# Patient Record
Sex: MALE | Race: BLACK OR AFRICAN AMERICAN | URBAN - METROPOLITAN AREA
[De-identification: names, ages, dates, MRNs, and addresses within clinical notes are randomized per-mention and may not be internally consistent; named-entity substitution may affect disease eponyms.]

---

## 2019-09-03 ENCOUNTER — HISTORICAL (OUTPATIENT)
Dept: RADIOLOGY | Facility: HOSPITAL | Age: 54
End: 2019-09-03

## 2019-09-03 LAB — POC CREATININE: 1 MG/DL (ref 0.6–1.3)

## 2021-06-25 ENCOUNTER — SPECIALTY PHARMACY (OUTPATIENT)
Dept: PHARMACY | Facility: CLINIC | Age: 56
End: 2021-06-25

## 2021-07-23 ENCOUNTER — SPECIALTY PHARMACY (OUTPATIENT)
Dept: PHARMACY | Facility: CLINIC | Age: 56
End: 2021-07-23

## 2021-07-26 ENCOUNTER — SPECIALTY PHARMACY (OUTPATIENT)
Dept: PHARMACY | Facility: CLINIC | Age: 56
End: 2021-07-26

## 2021-08-23 ENCOUNTER — SPECIALTY PHARMACY (OUTPATIENT)
Dept: PHARMACY | Facility: CLINIC | Age: 56
End: 2021-08-23

## 2021-08-23 RX ORDER — AMLODIPINE BESYLATE 10 MG/1
10 TABLET ORAL DAILY
COMMUNITY
Start: 2021-06-28

## 2021-08-23 RX ORDER — TRIAMCINOLONE ACETONIDE 1 MG/G
CREAM TOPICAL 2 TIMES DAILY PRN
COMMUNITY
Start: 2021-05-27

## 2021-08-23 RX ORDER — ATORVASTATIN CALCIUM 40 MG/1
40 TABLET, FILM COATED ORAL NIGHTLY
COMMUNITY
Start: 2021-06-28

## 2021-09-17 ENCOUNTER — SPECIALTY PHARMACY (OUTPATIENT)
Dept: PHARMACY | Facility: CLINIC | Age: 56
End: 2021-09-17

## 2021-10-16 ENCOUNTER — SPECIALTY PHARMACY (OUTPATIENT)
Dept: PHARMACY | Facility: CLINIC | Age: 56
End: 2021-10-16

## 2021-11-11 ENCOUNTER — SPECIALTY PHARMACY (OUTPATIENT)
Dept: PHARMACY | Facility: CLINIC | Age: 56
End: 2021-11-11

## 2021-12-09 ENCOUNTER — SPECIALTY PHARMACY (OUTPATIENT)
Dept: PHARMACY | Facility: CLINIC | Age: 56
End: 2021-12-09

## 2021-12-14 ENCOUNTER — SPECIALTY PHARMACY (OUTPATIENT)
Dept: PHARMACY | Facility: CLINIC | Age: 56
End: 2021-12-14

## 2022-01-06 ENCOUNTER — SPECIALTY PHARMACY (OUTPATIENT)
Dept: PHARMACY | Facility: CLINIC | Age: 57
End: 2022-01-06

## 2022-01-06 NOTE — TELEPHONE ENCOUNTER
Specialty Pharmacy - Refill Coordination    Specialty Medication Orders Linked to Encounter    Flowsheet Row Most Recent Value   Medication #1 dupilumab 300 mg/2 mL PnIj (Order#[id number], Rx#[id number])          Refill Questions - Documented Responses    Flowsheet Row Most Recent Value   Patient Availability and HIPAA Verification    Does patient want to proceed with activity? Yes   HIPAA/medical authority confirmed? Yes   Relationship to patient of person spoken to? Self   Refill Screening Questions    Changes to allergies? No   Changes to medications? No   New conditions since last clinic visit? No   Unplanned office visit, urgent care, ED, or hospital admission in the last 4 weeks? No   How does patient/caregiver feel medication is working? Good   Financial problems or insurance changes? No   How many doses of your specialty medications were missed in the last 4 weeks? 0   Would patient like to speak to a pharmacist? No   When does the patient need to receive the medication? 01/13/22   Refill Delivery Questions    How will the patient receive the medication? Mail   When does the patient need to receive the medication? 01/13/22   Shipping Address Home   Address in [facility] confirmed and updated if neccessary? Yes   Expected Copay ($) 0   Is the patient able to afford the medication copay? Yes   Payment Method zero copay   Days supply of Refill 28   Supplies needed? No supplies needed   Refill activity completed? Yes   Refill activity plan Refill scheduled   Shipment/Pickup Date: 01/10/22          Current Outpatient Medications   Medication Sig    amLODIPine (NORVASC) 10 MG tablet Take 10 mg by mouth once daily.    atorvastatin (LIPITOR) 40 MG tablet Take 40 mg by mouth nightly.    dupilumab 300 mg/2 mL PnIj Inject 300 mg into the skin every 14 days    triamcinolone acetonide 0.1% (KENALOG) 0.1 % cream Apply topically 2 (two) times daily as needed.   Last reviewed on 8/23/2021  4:35 PM by Hilario  Hansel Roy    Review of patient's allergies indicates:  Not on File Last reviewed on    by       Tasks added this encounter   2/3/2022 - Refill Call (Auto Added)   Tasks due within next 3 months   2/12/2022 - Clinical - Follow Up Assesement (180 day)     [facility] - Specialty Pharmacy  [address]  [facility] [address]  Phone: [phone]  Fax: [phone]

## 2022-02-03 ENCOUNTER — SPECIALTY PHARMACY (OUTPATIENT)
Dept: PHARMACY | Facility: CLINIC | Age: 57
End: 2022-02-03

## 2022-02-08 NOTE — TELEPHONE ENCOUNTER
Incoming call from patient regarding Dupixent prescription refill. Pt is due for his next dose on 2/10. Informed pt that it does not appear [facility] has received refills at this time. Confirmed refill request should be sent to Dr. Vogel. Pt states that he will call [facility] to request they send new Rx to [facility] ASAP.

## 2022-02-10 NOTE — TELEPHONE ENCOUNTER
Specialty Pharmacy - Refill Coordination    Specialty Medication Orders Linked to Encounter    Flowsheet Row Most Recent Value   Medication #1 dupilumab 300 mg/2 mL PnIj (Order#[id number], Rx#[id number])          Refill Questions - Documented Responses    Flowsheet Row Most Recent Value   Patient Availability and HIPAA Verification    Does patient want to proceed with activity? Yes   HIPAA/medical authority confirmed? Yes   Relationship to patient of person spoken to? Self   Refill Screening Questions    Changes to allergies? No   Changes to medications? No   New conditions since last clinic visit? No   Unplanned office visit, urgent care, ED, or hospital admission in the last 4 weeks? No   How does patient/caregiver feel medication is working? Very good   Financial problems or insurance changes? No   How many doses of your specialty medications were missed in the last 4 weeks? 0   Would patient like to speak to a pharmacist? No   When does the patient need to receive the medication? 02/11/22   Refill Delivery Questions    How will the patient receive the medication? Mail   When does the patient need to receive the medication? 02/11/22   Shipping Address Home   Address in [facility] confirmed and updated if neccessary? Yes   Expected Copay ($) 0   Is the patient able to afford the medication copay? Yes   Payment Method zero copay   Days supply of Refill 28   Supplies needed? No supplies needed   Refill activity completed? Yes   Refill activity plan Refill scheduled   Shipment/Pickup Date: 02/10/22          Current Outpatient Medications   Medication Sig    amLODIPine (NORVASC) 10 MG tablet Take 10 mg by mouth once daily.    atorvastatin (LIPITOR) 40 MG tablet Take 40 mg by mouth nightly.    dupilumab (DUPIXENT PEN) 300 mg/2 mL PnIj Inject 300 mg into the skin every 14 days    triamcinolone acetonide 0.1% (KENALOG) 0.1 % cream Apply topically 2 (two) times daily as needed.   Last reviewed on 8/23/2021   4:35 PM by Hilario Roy PharmD    Review of patient's allergies indicates:  Not on File Last reviewed on    by       Tasks added this encounter   3/4/2022 - Refill Call (Auto Added)   Tasks due within next 3 months   No tasks due.     Christina Arteaga, PharmD  [facility]  [address] [place] [address]  Phone: [phone]  Fax: [phone]

## 2022-03-05 ENCOUNTER — SPECIALTY PHARMACY (OUTPATIENT)
Dept: PHARMACY | Facility: CLINIC | Age: 57
End: 2022-03-05

## 2022-03-05 NOTE — TELEPHONE ENCOUNTER
Specialty Pharmacy - Refill Coordination    Specialty Medication Orders Linked to Encounter    Flowsheet Row Most Recent Value   Medication #1 dupilumab (DUPIXENT PEN) 300 mg/2 mL PnIj (Order#[id number], Rx#[id number])          Refill Questions - Documented Responses    Flowsheet Row Most Recent Value   Patient Availability and HIPAA Verification    Does patient want to proceed with activity? Yes   HIPAA/medical authority confirmed? Yes   Relationship to patient of person spoken to? Self   Refill Screening Questions    Changes to allergies? No   Changes to medications? No   New conditions since last clinic visit? No   Unplanned office visit, urgent care, ED, or hospital admission in the last 4 weeks? No   How does patient/caregiver feel medication is working? Good   Financial problems or insurance changes? No   How many doses of your specialty medications were missed in the last 4 weeks? 0   Would patient like to speak to a pharmacist? No   When does the patient need to receive the medication? 03/10/22   Refill Delivery Questions    How will the patient receive the medication? Mail   When does the patient need to receive the medication? 03/10/22   Shipping Address Home   Address in [facility] confirmed and updated if neccessary? Yes   Expected Copay ($) 0   Is the patient able to afford the medication copay? Yes   Payment Method zero copay   Days supply of Refill 28   Supplies needed? No supplies needed   Refill activity completed? Yes   Refill activity plan Refill scheduled   Shipment/Pickup Date: 03/07/22          Current Outpatient Medications   Medication Sig    amLODIPine (NORVASC) 10 MG tablet Take 10 mg by mouth once daily.    atorvastatin (LIPITOR) 40 MG tablet Take 40 mg by mouth nightly.    dupilumab (DUPIXENT PEN) 300 mg/2 mL PnIj Inject 300 mg into the skin every 14 days    triamcinolone acetonide 0.1% (KENALOG) 0.1 % cream Apply topically 2 (two) times daily as needed.   Last reviewed on  8/23/2021  4:35 PM by Hilario Roy PharmD    Review of patient's allergies indicates:  Not on File Last reviewed on    by       Tasks added this encounter   3/31/2022 - Refill Call (Auto Added)   Tasks due within next 3 months   No tasks due.     [facility] - Specialty Pharm[facility]  [address] [place] [address]  Phone: [phone]  Fax: [phone]

## 2022-03-31 ENCOUNTER — SPECIALTY PHARMACY (OUTPATIENT)
Dept: PHARMACY | Facility: CLINIC | Age: 57
End: 2022-03-31

## 2022-03-31 NOTE — TELEPHONE ENCOUNTER
Specialty Pharmacy - Refill Coordination    Specialty Medication Orders Linked to Encounter    Flowsheet Row Most Recent Value   Medication #1 dupilumab (DUPIXENT PEN) 300 mg/2 mL PnIj (Order#[id number], Rx#[id number])          Refill Questions - Documented Responses    Flowsheet Row Most Recent Value   Patient Availability and HIPAA Verification    Does patient want to proceed with activity? Yes   HIPAA/medical authority confirmed? Yes   Relationship to patient of person spoken to? Self   Refill Screening Questions    Changes to allergies? No   Changes to medications? No   New conditions since last clinic visit? No   Unplanned office visit, urgent care, ED, or hospital admission in the last 4 weeks? No   How does patient/caregiver feel medication is working? Good   Financial problems or insurance changes? No   How many doses of your specialty medications were missed in the last 4 weeks? 0   Would patient like to speak to a pharmacist? No   When does the patient need to receive the medication? 04/07/22   Refill Delivery Questions    How will the patient receive the medication? Mail   When does the patient need to receive the medication? 04/07/22   Shipping Address Home   Address in [facility] confirmed and updated if neccessary? Yes   Expected Copay ($) 0   Is the patient able to afford the medication copay? Yes   Payment Method zero copay   Days supply of Refill 28   Supplies needed? No supplies needed   Refill activity completed? Yes   Refill activity plan Refill scheduled   Shipment/Pickup Date: 04/05/22          Current Outpatient Medications   Medication Sig    amLODIPine (NORVASC) 10 MG tablet Take 10 mg by mouth once daily.    atorvastatin (LIPITOR) 40 MG tablet Take 40 mg by mouth nightly.    dupilumab (DUPIXENT PEN) 300 mg/2 mL PnIj Inject 300 mg into the skin every 14 days    triamcinolone acetonide 0.1% (KENALOG) 0.1 % cream Apply topically 2 (two) times daily as needed.   Last reviewed on  8/23/2021  4:35 PM by Hilario Roy PharmD    Review of patient's allergies indicates:  Not on File Last reviewed on    by       Tasks added this encounter   4/28/2022 - Refill Call (Auto Added)   Tasks due within next 3 months   No tasks due.     [facility] Vel[facility] - Specialty Pharmacy  [address] [place] [address]  Phone: [phone]  Fax: [phone]

## 2022-04-28 ENCOUNTER — SPECIALTY PHARMACY (OUTPATIENT)
Dept: PHARMACY | Facility: CLINIC | Age: 57
End: 2022-04-28

## 2022-04-28 NOTE — TELEPHONE ENCOUNTER
Specialty Pharmacy - Refill Coordination    Specialty Medication Orders Linked to Encounter    Flowsheet Row Most Recent Value   Medication #1 dupilumab (DUPIXENT PEN) 300 mg/2 mL PnIj (Order#[id number], Rx#[id number])          Refill Questions - Documented Responses    Flowsheet Row Most Recent Value   Patient Availability and HIPAA Verification    Does patient want to proceed with activity? Yes   HIPAA/medical authority confirmed? Yes   Relationship to patient of person spoken to? Self   Refill Screening Questions    Changes to allergies? No   Changes to medications? No   New conditions since last clinic visit? No   Unplanned office visit, urgent care, ED, or hospital admission in the last 4 weeks? No   How does patient/caregiver feel medication is working? Excellent   Financial problems or insurance changes? No   How many doses of your specialty medications were missed in the last 4 weeks? 0   Would patient like to speak to a pharmacist? No   When does the patient need to receive the medication? 05/05/22   Refill Delivery Questions    How will the patient receive the medication? Mail   When does the patient need to receive the medication? 05/05/22   Shipping Address Home   Address in [facility] confirmed and updated if neccessary? Yes   Expected Copay ($) 0   Is the patient able to afford the medication copay? Yes   Payment Method zero copay   Days supply of Refill 28   Supplies needed? No supplies needed   Refill activity completed? Yes   Refill activity plan Refill scheduled   Shipment/Pickup Date: 05/02/22          Current Outpatient Medications   Medication Sig    amLODIPine (NORVASC) 10 MG tablet Take 10 mg by mouth once daily.    atorvastatin (LIPITOR) 40 MG tablet Take 40 mg by mouth nightly.    dupilumab (DUPIXENT PEN) 300 mg/2 mL PnIj Inject 300 mg into the skin every 14 days    triamcinolone acetonide 0.1% (KENALOG) 0.1 % cream Apply topically 2 (two) times daily as needed.   Last reviewed on  8/23/2021  4:35 PM by Hilario Roy PharmD    Review of patient's allergies indicates:  Not on File Last reviewed on    by       Tasks added this encounter   No tasks added.   Tasks due within next 3 months   4/28/2022 - Refill Call (Auto Added)     [facility] - Specialty Pharmacy  [address] [place], [place] [address]  [facility] [address]  Phone: [phone]  Fax: [phone]

## 2022-05-26 ENCOUNTER — SPECIALTY PHARMACY (OUTPATIENT)
Dept: PHARMACY | Facility: CLINIC | Age: 57
End: 2022-05-26

## 2022-05-26 NOTE — TELEPHONE ENCOUNTER
Specialty Pharmacy - Refill Coordination    Specialty Medication Orders Linked to Encounter    Flowsheet Row Most Recent Value   Medication #1 dupilumab (DUPIXENT PEN) 300 mg/2 mL PnIj (Order#[id number], Rx#[id number])          Refill Questions - Documented Responses    Flowsheet Row Most Recent Value   Patient Availability and HIPAA Verification    Does patient want to proceed with activity? Yes   HIPAA/medical authority confirmed? Yes   Relationship to patient of person spoken to? Self   Refill Screening Questions    Changes to allergies? No   Changes to medications? No   New conditions since last clinic visit? No   Unplanned office visit, urgent care, ED, or hospital admission in the last 4 weeks? No   How does patient/caregiver feel medication is working? Fair   Financial problems or insurance changes? No   How many doses of your specialty medications were missed in the last 4 weeks? 0   Would patient like to speak to a pharmacist? No   When does the patient need to receive the medication? 06/02/22   Refill Delivery Questions    How will the patient receive the medication? Mail   When does the patient need to receive the medication? 06/02/22   Shipping Address Home   Address in [facility] confirmed and updated if neccessary? Yes   Expected Copay ($) 0   Is the patient able to afford the medication copay? Yes   Payment Method zero copay   Days supply of Refill 28   Supplies needed? No supplies needed   Refill activity completed? Yes   Refill activity plan Refill scheduled   Shipment/Pickup Date: 06/01/22          Current Outpatient Medications   Medication Sig    amLODIPine (NORVASC) 10 MG tablet Take 10 mg by mouth once daily.    atorvastatin (LIPITOR) 40 MG tablet Take 40 mg by mouth nightly.    dupilumab (DUPIXENT PEN) 300 mg/2 mL PnIj Inject 300 mg into the skin every 14 days    triamcinolone acetonide 0.1% (KENALOG) 0.1 % cream Apply topically 2 (two) times daily as needed.   Last reviewed on  8/23/2021  4:35 PM by Hilario Roy PharmD    Review of patient's allergies indicates:  Not on File Last reviewed on    by       Tasks added this encounter   No tasks added.   Tasks due within next 3 months   5/26/2022 - Refill Call (Auto Added)     [facility] - Specialty Pharmacy  [address] [place], [place] [address]  [facility] [address]  Phone: [phone]  Fax: [phone]

## 2022-06-23 ENCOUNTER — SPECIALTY PHARMACY (OUTPATIENT)
Dept: PHARMACY | Facility: CLINIC | Age: 57
End: 2022-06-23

## 2022-06-23 NOTE — TELEPHONE ENCOUNTER
Specialty Pharmacy - Refill Coordination    Specialty Medication Orders Linked to Encounter    Flowsheet Row Most Recent Value   Medication #1 dupilumab (DUPIXENT PEN) 300 mg/2 mL PnIj (Order#[id number], Rx#[id number])          Refill Questions - Documented Responses    Flowsheet Row Most Recent Value   Patient Availability and HIPAA Verification    Does patient want to proceed with activity? Yes   HIPAA/medical authority confirmed? Yes   Relationship to patient of person spoken to? Self   Refill Screening Questions    Changes to allergies? No   Changes to medications? No   New conditions since last clinic visit? No   Unplanned office visit, urgent care, ED, or hospital admission in the last 4 weeks? No   How does patient/caregiver feel medication is working? Good   Financial problems or insurance changes? No   How many doses of your specialty medications were missed in the last 4 weeks? 0   Would patient like to speak to a pharmacist? No   When does the patient need to receive the medication? 06/30/22   Refill Delivery Questions    How will the patient receive the medication? Mail   When does the patient need to receive the medication? 06/30/22   Shipping Address Home   Address in [facility] confirmed and updated if neccessary? Yes   Expected Copay ($) 0   Is the patient able to afford the medication copay? Yes   Payment Method zero copay   Days supply of Refill 28   Supplies needed? No supplies needed   Refill activity completed? Yes   Refill activity plan Refill scheduled   Shipment/Pickup Date: 06/27/22          Current Outpatient Medications   Medication Sig    amLODIPine (NORVASC) 10 MG tablet Take 10 mg by mouth once daily.    atorvastatin (LIPITOR) 40 MG tablet Take 40 mg by mouth nightly.    dupilumab (DUPIXENT PEN) 300 mg/2 mL PnIj Inject 300 mg into the skin every 14 days    triamcinolone acetonide 0.1% (KENALOG) 0.1 % cream Apply topically 2 (two) times daily as needed.   Last reviewed on  8/23/2021  4:35 PM by Hilario Roy PharmD    Review of patient's allergies indicates:  Not on File Last reviewed on    by       Tasks added this encounter   7/21/2022 - Refill Call (Auto Added)   Tasks due within next 3 months   No tasks due.     Ladonna Velazco [facility] - Specialty Pharmacy  [address] [place] [address]  Phone: [phone]  Fax: [phone]

## 2022-07-21 ENCOUNTER — SPECIALTY PHARMACY (OUTPATIENT)
Dept: PHARMACY | Facility: CLINIC | Age: 57
End: 2022-07-21

## 2022-07-21 NOTE — TELEPHONE ENCOUNTER
Spoke with patient for dupixent refill. Script out of refills, MD manually faxed. Patient due to inject 7/28. Will follow up when refills received.

## 2022-07-26 NOTE — TELEPHONE ENCOUNTER
Specialty Pharmacy - Refill Coordination    Specialty Medication Orders Linked to Encounter    Flowsheet Row Most Recent Value   Medication #1 dupilumab (DUPIXENT PEN) 300 mg/2 mL PnIj (Order#[id number], Rx#[id number])        Refill Questions - Documented Responses    Flowsheet Row Most Recent Value   Patient Availability and HIPAA Verification    Does patient want to proceed with activity? Yes   HIPAA/medical authority confirmed? Yes   Relationship to patient of person spoken to? Self   Refill Screening Questions    Changes to allergies? No   Changes to medications? No   New conditions since last clinic visit? No   Unplanned office visit, urgent care, ED, or hospital admission in the last 4 weeks? No   How does patient/caregiver feel medication is working? Good   Financial problems or insurance changes? No   How many doses of your specialty medications were missed in the last 4 weeks? 0   Would patient like to speak to a pharmacist? No   When does the patient need to receive the medication? 07/28/22   Refill Delivery Questions    How will the patient receive the medication? Delivery Marcella   When does the patient need to receive the medication? 07/28/22   Shipping Address Home   Address in [facility] confirmed and updated if neccessary? Yes   Expected Copay ($) 0   Is the patient able to afford the medication copay? Yes   Payment Method zero copay   Days supply of Refill 28   Supplies needed? Injection Device   Refill activity completed? Yes   Refill activity plan Refill scheduled   Shipment/Pickup Date: 07/27/22          Current Outpatient Medications   Medication Sig    amLODIPine (NORVASC) 10 MG tablet Take 10 mg by mouth once daily.    atorvastatin (LIPITOR) 40 MG tablet Take 40 mg by mouth nightly.    dupilumab (DUPIXENT PEN) 300 mg/2 mL PnIj Inject 300 mg into the skin every 14 days    dupilumab (DUPIXENT PEN) 300 mg/2 mL PnIj Inject 300 mg into the skin every 14 (fourteen) days.    triamcinolone  acetonide 0.1% (KENALOG) 0.1 % cream Apply topically 2 (two) times daily as needed.   Last reviewed on 8/23/2021  4:35 PM by Hilario Roy PharmD    Review of patient's allergies indicates:  Not on File Last reviewed on    by       Tasks added this encounter   8/18/2022 - Refill Call (Auto Added)   Tasks due within next 3 months   No tasks due.     Agapito Griggs, PharmD  [facility]  [address] [place], [place] [address]  [facility] [address]  Phone: [phone]  Fax: [phone]

## 2022-08-18 ENCOUNTER — SPECIALTY PHARMACY (OUTPATIENT)
Dept: PHARMACY | Facility: CLINIC | Age: 57
End: 2022-08-18

## 2022-08-18 NOTE — TELEPHONE ENCOUNTER
Outgoing call for "Carmolex," refill, patient next injection is due on 8/25/2022. Patient does not have any remaining doses on hand. Patient copay card rejected,  reported patient needs to provide additional information. Provided phone number to patient. Patient will call [facility] with copay card information. Will follow up.

## 2022-08-22 NOTE — TELEPHONE ENCOUNTER
Outgoing all regarding Dupxient copay status. Pt stated he called the MD to get the information sent to the copay assistance and will follow up with Dupixent copay card company again 8/22/22 for status.

## 2022-08-22 NOTE — TELEPHONE ENCOUNTER
Incoming call from patient. Per patient dupixent copay card company has not received any faxed information. Asked patient to find out where MD's office faxed information they se and if the patient can provide the information the copay card company is trying to receive over the phone. Will route to assigned [facility].

## 2022-08-23 NOTE — TELEPHONE ENCOUNTER
Incoming call regarding Dupixent copay credit card. Wife called and gave [facility] Dupixent copay credit card and added it to file and wamb. Pts wife stated if it doesn't work to please call her on Mobile [phone].

## 2022-08-23 NOTE — TELEPHONE ENCOUNTER
Specialty Pharmacy - Refill Coordination    Specialty Medication Orders Linked to Encounter    Flowsheet Row Most Recent Value   Medication #1 dupilumab (DUPIXENT PEN) 300 mg/2 mL PnIj (Order#[id number], Rx#[id number])          Refill Questions - Documented Responses    Flowsheet Row Most Recent Value   Patient Availability and HIPAA Verification    Does patient want to proceed with activity? Yes   HIPAA/medical authority confirmed? Yes   Relationship to patient of person spoken to? Self   Refill Screening Questions    Changes to allergies? No   Changes to medications? No   New conditions since last clinic visit? No   Unplanned office visit, urgent care, ED, or hospital admission in the last 4 weeks? No   How does patient/caregiver feel medication is working? Good   Financial problems or insurance changes? No   How many doses of your specialty medications were missed in the last 4 weeks? 0   Would patient like to speak to a pharmacist? No   When does the patient need to receive the medication? 08/26/22   Refill Delivery Questions    How will the patient receive the medication? Delivery Marcella   When does the patient need to receive the medication? 08/26/22   Shipping Address Home   Address in [facility] confirmed and updated if neccessary? Yes   Expected Copay ($) 958.4   Is the patient able to afford the medication copay? Yes   Payment Method  CC on file   Days supply of Refill 28   Supplies needed? No supplies needed   Refill activity completed? Yes   Refill activity plan Refill scheduled   Shipment/Pickup Date: 08/24/22          Current Outpatient Medications   Medication Sig    amLODIPine (NORVASC) 10 MG tablet Take 10 mg by mouth once daily.    atorvastatin (LIPITOR) 40 MG tablet Take 40 mg by mouth nightly.    dupilumab (DUPIXENT PEN) 300 mg/2 mL PnIj Inject 300 mg into the skin every 14 days    dupilumab (DUPIXENT PEN) 300 mg/2 mL PnIj Inject 300 mg into the skin every 14 (fourteen) days.     triamcinolone acetonide 0.1% (KENALOG) 0.1 % cream Apply topically 2 (two) times daily as needed.   Last reviewed on 8/23/2021  4:35 PM by Hilario Roy PharmD    Review of patient's allergies indicates:  Not on File Last reviewed on    by       Tasks added this encounter   9/16/2022 - Refill Call (Auto Added)   Tasks due within next 3 months   No tasks due.     Ladonna Velazco [facility] - Specialty Pharmacy  [address] [facility] [address]  Phone: [phone]  Fax: [phone]

## 2022-09-16 ENCOUNTER — SPECIALTY PHARMACY (OUTPATIENT)
Dept: PHARMACY | Facility: CLINIC | Age: 57
End: 2022-09-16

## 2022-09-16 NOTE — TELEPHONE ENCOUNTER
Specialty Pharmacy - Refill Coordination    Specialty Medication Orders Linked to Encounter      Flowsheet Row Most Recent Value   Medication #1 dupilumab (DUPIXENT PEN) 300 mg/2 mL PnIj (Order#[id number], Rx#[id number])            Refill Questions - Documented Responses      Flowsheet Row Most Recent Value   Patient Availability and HIPAA Verification    Does patient want to proceed with activity? Yes   HIPAA/medical authority confirmed? Yes   Relationship to patient of person spoken to? Self   Refill Screening Questions    Changes to allergies? No   Changes to medications? No   New conditions since last clinic visit? No   Unplanned office visit, urgent care, ED, or hospital admission in the last 4 weeks? No   How does patient/caregiver feel medication is working? Very good   Financial problems or insurance changes? No   How many doses of your specialty medications were missed in the last 4 weeks? 0   Would patient like to speak to a pharmacist? No   When does the patient need to receive the medication? 09/22/22   Refill Delivery Questions    How will the patient receive the medication? Delivery Marcella   When does the patient need to receive the medication? 09/22/22   Shipping Address Home   Address in [facility] confirmed and updated if neccessary? Yes   Expected Copay ($) 958.4   Is the patient able to afford the medication copay? Yes   Payment Method  CC on file   Days supply of Refill 28   Supplies needed? No supplies needed   Refill activity completed? Yes   Refill activity plan Refill scheduled   Shipment/Pickup Date: 09/19/22            Current Outpatient Medications   Medication Sig    amLODIPine (NORVASC) 10 MG tablet Take 10 mg by mouth once daily.    atorvastatin (LIPITOR) 40 MG tablet Take 40 mg by mouth nightly.    dupilumab (DUPIXENT PEN) 300 mg/2 mL PnIj Inject 300 mg into the skin every 14 days    dupilumab (DUPIXENT PEN) 300 mg/2 mL PnIj Inject 300 mg into the skin every 14  (fourteen) days.    triamcinolone acetonide 0.1% (KENALOG) 0.1 % cream Apply topically 2 (two) times daily as needed.   Last reviewed on 8/23/2021  4:35 PM by Hilario Roy PharmD    Review of patient's allergies indicates:  Not on File Last reviewed on    by       Tasks added this encounter   10/13/2022 - Refill Call (Auto Added)   Tasks due within next 3 months   No tasks due.     [facility] Pharm[facility]  [address] [facility] [address]  Phone: [phone]  Fax: [phone]

## 2022-10-13 ENCOUNTER — SPECIALTY PHARMACY (OUTPATIENT)
Dept: PHARMACY | Facility: CLINIC | Age: 57
End: 2022-10-13

## 2022-10-13 NOTE — TELEPHONE ENCOUNTER
Specialty Pharmacy - Refill Coordination    Specialty Medication Orders Linked to Encounter      Flowsheet Row Most Recent Value   Medication #1 dupilumab (DUPIXENT PEN) 300 mg/2 mL PnIj (Order#[id number], Rx#[id number])            Refill Questions - Documented Responses      Flowsheet Row Most Recent Value   Patient Availability and HIPAA Verification    Does patient want to proceed with activity? Yes   HIPAA/medical authority confirmed? Yes   Relationship to patient of person spoken to? Self   Refill Screening Questions    Changes to allergies? No   Changes to medications? No   New conditions since last clinic visit? No   Unplanned office visit, urgent care, ED, or hospital admission in the last 4 weeks? No   How does patient/caregiver feel medication is working? Good   Financial problems or insurance changes? No   How many doses of your specialty medications were missed in the last 4 weeks? 0   Would patient like to speak to a pharmacist? No   When does the patient need to receive the medication? 10/20/22   Refill Delivery Questions    How will the patient receive the medication? MEDRx   When does the patient need to receive the medication? 10/20/22   Shipping Address Home   Address in [facility] confirmed and updated if neccessary? Yes   Expected Copay ($) 958.4   Is the patient able to afford the medication copay? Yes   Payment Method CC on file   Days supply of Refill 28   Supplies needed? No supplies needed   Refill activity completed? Yes   Refill activity plan Refill scheduled   Shipment/Pickup Date: 10/14/22            Current Outpatient Medications   Medication Sig    amLODIPine (NORVASC) 10 MG tablet Take 10 mg by mouth once daily.    atorvastatin (LIPITOR) 40 MG tablet Take 40 mg by mouth nightly.    dupilumab (DUPIXENT PEN) 300 mg/2 mL PnIj Inject 300 mg into the skin every 14 days    dupilumab (DUPIXENT PEN) 300 mg/2 mL PnIj Inject 300 mg into the skin every 14 (fourteen) days.    triamcinolone  acetonide 0.1% (KENALOG) 0.1 % cream Apply topically 2 (two) times daily as needed.   Last reviewed on 8/23/2021  4:35 PM by Hilario Roy PharmD    Review of patient's allergies indicates:  Not on File Last reviewed on    by       Tasks added this encounter   11/10/2022 - Refill Call (Auto Added)   Tasks due within next 3 months   No tasks due.     [facility] - Specialty Pharmacy  [address] [place] [address]  Phone: [phone]  Fax: [phone]

## 2022-11-10 ENCOUNTER — SPECIALTY PHARMACY (OUTPATIENT)
Dept: PHARMACY | Facility: CLINIC | Age: 57
End: 2022-11-10

## 2022-11-10 NOTE — TELEPHONE ENCOUNTER
Outgoing call regarding Dupixent refill. Pt stated that he is due to inject on 11/17/22. Informed pt PA is required by his insurance and will follow up with pt when PA is approved to set up refill. Pt verbalized understanding. Routing to assigned pharmacist.

## 2022-11-15 NOTE — TELEPHONE ENCOUNTER
Dupixent PA approved. The request has been approved. The authorization is effective for a maximum of 12 fills from 11/15/2022 to 11/14/2023, as long as the member is enrolled in their current health plan. The request was approved with a quantity restriction. This has been approved for a quantity limit of 4 with a day supply limit of 28.       Specialty Pharmacy - Refill Coordination  Specialty Pharmacy - Medication/Referral Authorization    Specialty Medication Orders Linked to Encounter      Flowsheet Row Most Recent Value   Medication #1 dupilumab (DUPIXENT PEN) 300 mg/2 mL PnIj (Order#[id number], Rx#[id number])            Refill Questions - Documented Responses      Flowsheet Row Most Recent Value   Patient Availability and HIPAA Verification    Does patient want to proceed with activity? Yes   HIPAA/medical authority confirmed? Yes   Relationship to patient of person spoken to? Self   Refill Screening Questions    Changes to allergies? No   Changes to medications? No   New conditions since last clinic visit? No   Unplanned office visit, urgent care, ED, or hospital admission in the last 4 weeks? No   How does patient/caregiver feel medication is working? Excellent   Financial problems or insurance changes? No   How many doses of your specialty medications were missed in the last 4 weeks? 0   Would patient like to speak to a pharmacist? No   When does the patient need to receive the medication? 11/18/22   Refill Delivery Questions    How will the patient receive the medication? MEDRx   When does the patient need to receive the medication? 11/18/22   Shipping Address Home   Address in [facility] confirmed and updated if neccessary? No   Expected Copay ($) 958.4   Is the patient able to afford the medication copay? Yes   Payment Method  CC on file   Days supply of Refill 28   Supplies needed? No supplies needed   Refill activity completed? Yes   Refill activity plan Refill scheduled    Shipment/Pickup Date: 11/16/22            Current Outpatient Medications   Medication Sig    amLODIPine (NORVASC) 10 MG tablet Take 10 mg by mouth once daily.    atorvastatin (LIPITOR) 40 MG tablet Take 40 mg by mouth nightly.    dupilumab (DUPIXENT PEN) 300 mg/2 mL PnIj Inject 300 mg into the skin every 14 days    dupilumab (DUPIXENT PEN) 300 mg/2 mL PnIj Inject 300 mg into the skin every 14 (fourteen) days.    triamcinolone acetonide 0.1% (KENALOG) 0.1 % cream Apply topically 2 (two) times daily as needed.   Last reviewed on 8/23/2021  4:35 PM by Hilario Roy PharmD    Review of patient's allergies indicates:  Not on File Last reviewed on    by       Tasks added this encounter   11/10/2022 - Referral Authorization   Tasks due within next 3 months   11/10/2022 - Refill Call (Auto Added)     [facility] - Specialty Pharmacy  [address] [place], [place] [address]  [facility] [address]  Phone: [phone]  Fax: [phone]

## 2022-12-09 ENCOUNTER — SPECIALTY PHARMACY (OUTPATIENT)
Dept: PHARMACY | Facility: CLINIC | Age: 57
End: 2022-12-09

## 2022-12-09 DIAGNOSIS — L20.9 ATOPIC DERMATITIS, UNSPECIFIED TYPE: Primary | ICD-10-CM

## 2022-12-09 NOTE — TELEPHONE ENCOUNTER
Outgoing call: Patient stated he wont need his next injection until 12/30, I informed him that [facility] will follow up closer to his next injection.

## 2022-12-14 NOTE — TELEPHONE ENCOUNTER
Outgoing going call regarding Dupixent refill, pt would like a call back on 12/27 to set up refill, informed pt will call back on 12/27

## 2022-12-27 NOTE — TELEPHONE ENCOUNTER
Specialty Pharmacy - Refill Coordination  Specialty Pharmacy - Clinical Reassessment    Specialty Medication Orders Linked to Encounter      Flowsheet Row Most Recent Value   Medication #1 dupilumab (DUPIXENT PEN) 300 mg/2 mL PnIj (Order#[id number], Rx#[id number])            Refill Questions - Documented Responses      Flowsheet Row Most Recent Value   Patient Availability and HIPAA Verification    Does patient want to proceed with activity? Yes   HIPAA/medical authority confirmed? Yes   Relationship to patient of person spoken to? Self   Refill Screening Questions    Changes to allergies? No   Changes to medications? No   New conditions since last clinic visit? No   Unplanned office visit, urgent care, ED, or hospital admission in the last 4 weeks? No   How does patient/caregiver feel medication is working? Very good   Financial problems or insurance changes? No   How many doses of your specialty medications were missed in the last 4 weeks? 0   Would patient like to speak to a pharmacist? No   When does the patient need to receive the medication? 12/30/22   Refill Delivery Questions    How will the patient receive the medication? MEDRx   When does the patient need to receive the medication? 12/30/22   Shipping Address Home   Address in [facility] confirmed and updated if neccessary? Yes   Expected Copay ($) 958.4   Is the patient able to afford the medication copay? Yes   Payment Method  CC on file   Days supply of Refill 28   Supplies needed? Alcohol Swabs   Refill activity completed? Yes   Refill activity plan Refill scheduled   Shipment/Pickup Date: 12/29/22            Current Outpatient Medications   Medication Sig    amLODIPine (NORVASC) 10 MG tablet Take 10 mg by mouth once daily.    atorvastatin (LIPITOR) 40 MG tablet Take 40 mg by mouth nightly.    dupilumab (DUPIXENT PEN) 300 mg/2 mL PnIj Inject 300 mg into the skin every 14 (fourteen) days.    triamcinolone acetonide 0.1% (KENALOG) 0.1  % cream Apply topically 2 (two) times daily as needed.   Last reviewed on 12/27/2022 11:44 AM by Karen Delgado PharmD    Review of patient's allergies indicates:  No Known Allergies Last reviewed on  12/27/2022 11:44 AM by Karen Delgado    Interventions added this encounter   Closed: [facility] Patient Assessment: dupilumab (DUPIXENT PEN) 300 mg/2 mL PnIj     Tasks added this encounter   1/20/2023 - Refill Call (Auto Added)   Tasks due within next 3 months   No tasks due.     Karen Delgado PharmD  [facility] - Specialty Pharmacy  [address] [place] [address]  Phone: [phone]  Fax: [phone]

## 2022-12-27 NOTE — TELEPHONE ENCOUNTER
Specialty Pharmacy - Refill Coordination    Specialty Medication Orders Linked to Encounter      Flowsheet Row Most Recent Value   Medication #1 dupilumab (DUPIXENT PEN) 300 mg/2 mL PnIj (Order#[id number], Rx#[id number])            Refill Questions - Documented Responses      Flowsheet Row Most Recent Value   Patient Availability and HIPAA Verification    Does patient want to proceed with activity? Yes   HIPAA/medical authority confirmed? Yes   Relationship to patient of person spoken to? Self   Refill Screening Questions    Changes to allergies? No   Changes to medications? No   New conditions since last clinic visit? No   Unplanned office visit, urgent care, ED, or hospital admission in the last 4 weeks? No   How does patient/caregiver feel medication is working? Very good   Financial problems or insurance changes? No   How many doses of your specialty medications were missed in the last 4 weeks? 0   Would patient like to speak to a pharmacist? No   When does the patient need to receive the medication? 12/30/22   Refill Delivery Questions    How will the patient receive the medication? MEDRx   When does the patient need to receive the medication? 12/30/22   Shipping Address Home   Address in [facility] confirmed and updated if neccessary? Yes   Expected Copay ($) 958.4   Is the patient able to afford the medication copay? Yes   Payment Method  CC on file   Days supply of Refill 28   Supplies needed? Alcohol Swabs   Refill activity completed? Yes   Refill activity plan Refill scheduled   Shipment/Pickup Date: 12/29/22            Current Outpatient Medications   Medication Sig    amLODIPine (NORVASC) 10 MG tablet Take 10 mg by mouth once daily.    atorvastatin (LIPITOR) 40 MG tablet Take 40 mg by mouth nightly.    dupilumab (DUPIXENT PEN) 300 mg/2 mL PnIj Inject 300 mg into the skin every 14 (fourteen) days.    triamcinolone acetonide 0.1% (KENALOG) 0.1 % cream Apply topically 2 (two) times daily  as needed.   Last reviewed on 8/23/2021  4:35 PM by Hilario Roy PharmD    Review of patient's allergies indicates:  Not on File Last reviewed on    by       Tasks added this encounter   1/20/2023 - Refill Call (Auto Added)   Tasks due within next 3 months   No tasks due.     [facility] - Specialty Pharmacy  [address] [place] [id number]  Phone: [phone]  Fax: [phone]

## 2023-01-20 ENCOUNTER — SPECIALTY PHARMACY (OUTPATIENT)
Dept: PHARMACY | Facility: CLINIC | Age: 58
End: 2023-01-20

## 2023-01-20 NOTE — TELEPHONE ENCOUNTER
Specialty Pharmacy - Refill Coordination    Specialty Medication Orders Linked to Encounter      Flowsheet Row Most Recent Value   Medication #1 dupilumab (DUPIXENT PEN) 300 mg/2 mL PnIj (Order#[id number], Rx#[id number])            Refill Questions - Documented Responses      Flowsheet Row Most Recent Value   Patient Availability and HIPAA Verification    Does patient want to proceed with activity? Yes   HIPAA/medical authority confirmed? Yes   Relationship to patient of person spoken to? Self   Refill Screening Questions    Changes to allergies? No   Changes to medications? No   New conditions since last clinic visit? No   Unplanned office visit, urgent care, ED, or hospital admission in the last 4 weeks? No   How does patient/caregiver feel medication is working? Excellent   Financial problems or insurance changes? No   How many doses of your specialty medications were missed in the last 4 weeks? 0   Would patient like to speak to a pharmacist? No   When does the patient need to receive the medication? 01/27/23   Refill Delivery Questions    How will the patient receive the medication? MEDRx   When does the patient need to receive the medication? 01/27/23   Shipping Address Home   Address in [facility] confirmed and updated if neccessary? Yes   Expected Copay ($) 958.4   Is the patient able to afford the medication copay? Yes   Payment Method  CC on file   Days supply of Refill 28   Supplies needed? Alcohol Swabs   Refill activity completed? Yes   Refill activity plan Refill scheduled   Shipment/Pickup Date: 01/24/23            Current Outpatient Medications   Medication Sig    amLODIPine (NORVASC) 10 MG tablet Take 10 mg by mouth once daily.    atorvastatin (LIPITOR) 40 MG tablet Take 40 mg by mouth nightly.    dupilumab (DUPIXENT PEN) 300 mg/2 mL PnIj Inject 300 mg into the skin every 14 (fourteen) days.    triamcinolone acetonide 0.1% (KENALOG) 0.1 % cream Apply topically 2 (two) times daily  as needed.   Last reviewed on 12/27/2022 11:44 AM by Karen Delgado PharmD    Review of patient's allergies indicates:  No Known Allergies Last reviewed on  12/27/2022 11:44 AM by Karen Delgado      Tasks added this encounter   No tasks added.   Tasks due within next 3 months   2/17/2023 - Refill Call (Auto Added)     H[facility]el [facility]ales - Specialty Pharmacy  [address] [place], [place] [address]  [facility] [address]  Phone: [phone]  Fax: [phone]

## 2023-02-17 ENCOUNTER — SPECIALTY PHARMACY (OUTPATIENT)
Dept: PHARMACY | Facility: CLINIC | Age: 58
End: 2023-02-17

## 2023-02-17 NOTE — TELEPHONE ENCOUNTER
Outgoing call: Patient has 1 pen on hand for his next injection on 3/7. He requested that [facility] give him a call back in 1- 1.5 weeks. [facility] will follow up.

## 2023-02-28 NOTE — TELEPHONE ENCOUNTER
Followed up with patient for Dupixent refill. Patient reports that he recently changed jobs and no longer has [employer] insurance. Last injection was on 2/26. Next dose is due on 3/12. Patient reports that new insurance will be active on 3/1 tentatively. Unable to find coverage through eligibility check. Advised that we will follow up on 3/1 to see if plan is active.

## 2023-03-07 NOTE — TELEPHONE ENCOUNTER
Incoming call from pt. New insurance added. Test claim show new PA needed. Notifying assigned Rph.

## 2023-03-08 NOTE — TELEPHONE ENCOUNTER
PA started on [employer] PA website and submitted via fax to [phone]. EOC ID: [id number]

## 2023-03-09 NOTE — TELEPHONE ENCOUNTER
Incoming call from pt checking on status of Dupixent. Informed pt PA was submitted, awaiting determination. Pt would like a call back once a determination is made. Routing assigned PharmD to follow up

## 2023-03-10 NOTE — TELEPHONE ENCOUNTER
Per insurance rep, Dupixent PA is approved through 3/9/2024. However, the approval has not been entered into the system. Claim still rejecting for PA. Rep advised to re-run claim later today. Approval letter requested to be faxed to [facility].

## 2023-03-13 NOTE — TELEPHONE ENCOUNTER
Incoming call from rep at [facility]. She stated patient called in and rep was confused on why as she sees everything is approved on her end in regards to Dupixent. Will alert [facility].

## 2023-03-13 NOTE — TELEPHONE ENCOUNTER
Specialty Pharmacy - Refill Coordination  Specialty Pharmacy - Medication/Referral Authorization    Specialty Medication Orders Linked to Encounter      Flowsheet Row Most Recent Value   Medication #1 dupilumab (DUPIXENT PEN) 300 mg/2 mL PnIj (Order#[id number], Rx#[id number])     Patient provided phone number for [facility] and aware he will have to process future fills through [facility] due to insurance limits. Patient will reach out to [facility] and begin transfer process in early April. Will route to assigned pharmacist for awareness           Refill Questions - Documented Responses      Flowsheet Row Most Recent Value   Patient Availability and HIPAA Verification    Does patient want to proceed with activity? Yes   HIPAA/medical authority confirmed? Yes   Relationship to patient of person spoken to? Self   Refill Screening Questions    Changes to allergies? No   Changes to medications? No   New conditions since last clinic visit? No   Unplanned office visit, urgent care, ED, or hospital admission in the last 4 weeks? No   How does patient/caregiver feel medication is working? Very good   Financial problems or insurance changes? No   How many doses of your specialty medications were missed in the last 4 weeks? 0   Would patient like to speak to a pharmacist? No   When does the patient need to receive the medication? 03/15/23   Refill Delivery Questions    How will the patient receive the medication? MEDRx   When does the patient need to receive the medication? 03/15/23   Shipping Address Home   Address in [facility] confirmed and updated if neccessary? Yes   Expected Copay ($) 0   Is the patient able to afford the medication copay? Yes   Payment Method zero copay   Days supply of Refill 28   Supplies needed? No supplies needed   Refill activity completed? Yes   Refill activity plan Refill scheduled   Shipment/Pickup Date: 03/14/23            Current Outpatient Medications   Medication Sig    amLODIPine (NORVASC) 10 MG tablet  Take 10 mg by mouth once daily.    atorvastatin (LIPITOR) 40 MG tablet Take 40 mg by mouth nightly.    dupilumab (DUPIXENT PEN) 300 mg/2 mL PnIj Inject 300 mg into the skin every 14 (fourteen) days.    triamcinolone acetonide 0.1% (KENALOG) 0.1 % cream Apply topically 2 (two) times daily as needed.   Last reviewed on 12/27/2022 11:44 AM by Karen Delgado PharmD    Review of patient's allergies indicates:  No Known Allergies Last reviewed on  12/27/2022 11:44 AM by Karen Delgado      Tasks added this encounter   3/7/2023 - Referral Authorization   Tasks due within next 3 months   2/17/2023 - Refill Call (Auto Added)     Shayne Slaughter, PharmD  [facility]  [address]  [facility] [address]  Phone: [phone]  Fax: [phone]

## 2023-03-13 NOTE — TELEPHONE ENCOUNTER
Incoming/Outgoing call. Pt called to check on Status on his Dupiexnt. Insurance was still rejecting. Informed pt will reach out to the Insurance to see if there is anything we can help with. Called the Insurance company and they are working on the pt file to see what is going on. Rep asked if she can call us back. Provided [facility] number for them check. Its rejecting needing new PA, Per note reads it was approved. So the Insurance rep if she can look more into it and will call us back.

## 2023-03-13 NOTE — TELEPHONE ENCOUNTER
Specialty Pharmacy - Refill Coordination  Specialty Pharmacy - Medication/Referral Authorization    Specialty Medication Orders Linked to Encounter      Flowsheet Row Most Recent Value   Medication #1 dupilumab (DUPIXENT PEN) 300 mg/2 mL PnIj (Order#[id number], Rx#[id number])     Patient provided phone number for [facility] and aware he will have to process future fills through [facility] due to insurance limits. Patient will reach out to [facility] and begin transfer process in early April. Will route to assigned pharmacist for awareness        Refill Questions - Documented Responses      Flowsheet Row Most Recent Value   Patient Availability and HIPAA Verification    Does patient want to proceed with activity? Yes   HIPAA/medical authority confirmed? Yes   Relationship to patient of person spoken to? Self   Refill Screening Questions    Changes to allergies? No   Changes to medications? No   New conditions since last clinic visit? No   Unplanned office visit, urgent care, ED, or hospital admission in the last 4 weeks? No   How does patient/caregiver feel medication is working? Very good   Financial problems or insurance changes? No   How many doses of your specialty medications were missed in the last 4 weeks? 0   Would patient like to speak to a pharmacist? No   When does the patient need to receive the medication? 03/15/23   Refill Delivery Questions    How will the patient receive the medication? MEDRx   When does the patient need to receive the medication? 03/15/23   Shipping Address Home   Address in [facility] confirmed and updated if neccessary? Yes   Expected Copay ($) 0   Is the patient able to afford the medication copay? Yes   Payment Method zero copay   Days supply of Refill 28   Supplies needed? No supplies needed   Refill activity completed? Yes   Refill activity plan Refill scheduled   Shipment/Pickup Date: 03/14/23            Current Outpatient Medications   Medication Sig    amLODIPine (NORVASC) 10 MG tablet  Take 10 mg by mouth once daily.    atorvastatin (LIPITOR) 40 MG tablet Take 40 mg by mouth nightly.    dupilumab (DUPIXENT PEN) 300 mg/2 mL PnIj Inject 300 mg into the skin every 14 (fourteen) days.    triamcinolone acetonide 0.1% (KENALOG) 0.1 % cream Apply topically 2 (two) times daily as needed.   Last reviewed on 12/27/2022 11:44 AM by Karen Delgado PharmD    Review of patient's allergies indicates:  No Known Allergies Last reviewed on  12/27/2022 11:44 AM by Karen Delgado      Tasks added this encounter   3/7/2023 - Referral Authorization   Tasks due within next 3 months   2/17/2023 - Refill Call (Auto Added)     Shayne Slaughter, PharmD  [facility]  [address]  [facility] [address]  Phone: [phone]  Fax: [phone]

## 2023-03-13 NOTE — TELEPHONE ENCOUNTER
Claim is still rejecting for Dupixent stating PA required. Called patient's insurance to check on the status. Rep confirmed again that the PA is approved, however patient has to call the plan at [phone] to enroll in their patient assistance program.    Called patient and advised of the info above as well as provided him with the phone number for the plan. Patient stated that he will call the plan and follow up with [facility] thereafter.

## 2023-03-13 NOTE — TELEPHONE ENCOUNTER
Incoming call regarding Dupixent refill. Kelsey Betts from [facility] called and stated to place COM infront of the group number for over ride for this fill. She stated the pt will be receiving his Dupixent from [facility] and provided there number [phone]. Rep stated we can fill this time and they will reach out to pt for April. Rep stated she has already spoken to the pt. Routing to assigned pharmacist for FYI.

## 2023-03-13 NOTE — TELEPHONE ENCOUNTER
Contacted [facility] to confirm patient will be filling with their pharmacy moving forward. They do not have an RX on file and will need to transfer prescription prior to patients next fill. Will notify patient

## 2023-03-29 ENCOUNTER — SPECIALTY PHARMACY (OUTPATIENT)
Dept: PHARMACY | Facility: CLINIC | Age: 58
End: 2023-03-29

## 2023-03-29 NOTE — TELEPHONE ENCOUNTER
Incoming call from [facility] pharmacy, provided transfer to rep Krystin COLON for Dupixent, closing referral at [facility].